# Patient Record
Sex: FEMALE | Race: BLACK OR AFRICAN AMERICAN | NOT HISPANIC OR LATINO
[De-identification: names, ages, dates, MRNs, and addresses within clinical notes are randomized per-mention and may not be internally consistent; named-entity substitution may affect disease eponyms.]

---

## 2020-08-07 ENCOUNTER — DASHBOARD ENCOUNTERS (OUTPATIENT)
Age: 20
End: 2020-08-07

## 2020-08-07 ENCOUNTER — WEB ENCOUNTER (OUTPATIENT)
Dept: URBAN - METROPOLITAN AREA CLINIC 84 | Facility: CLINIC | Age: 20
End: 2020-08-07

## 2020-08-07 ENCOUNTER — OFFICE VISIT (OUTPATIENT)
Dept: URBAN - METROPOLITAN AREA CLINIC 84 | Facility: CLINIC | Age: 20
End: 2020-08-07
Payer: COMMERCIAL

## 2020-08-07 DIAGNOSIS — R19.4 CHANGE IN BOWEL HABITS: ICD-10-CM

## 2020-08-07 DIAGNOSIS — R10.13 DYSPEPSIA: ICD-10-CM

## 2020-08-07 DIAGNOSIS — R10.84 LEFT LOWER ABDOMINAL PAIN: ICD-10-CM

## 2020-08-07 DIAGNOSIS — R10.31 RIGHT LOWER QUADRANT ABDOMINAL PAIN: ICD-10-CM

## 2020-08-07 PROCEDURE — G9903 PT SCRN TBCO ID AS NON USER: HCPCS | Performed by: INTERNAL MEDICINE

## 2020-08-07 PROCEDURE — G8427 DOCREV CUR MEDS BY ELIG CLIN: HCPCS | Performed by: INTERNAL MEDICINE

## 2020-08-07 PROCEDURE — 99244 OFF/OP CNSLTJ NEW/EST MOD 40: CPT | Performed by: INTERNAL MEDICINE

## 2020-08-07 PROCEDURE — 1036F TOBACCO NON-USER: CPT | Performed by: INTERNAL MEDICINE

## 2020-08-07 PROCEDURE — G8417 CALC BMI ABV UP PARAM F/U: HCPCS | Performed by: INTERNAL MEDICINE

## 2020-08-07 RX ORDER — SODIUM, POTASSIUM,MAG SULFATES 17.5-3.13G
354 ML SOLUTION, RECONSTITUTED, ORAL ORAL ONCE
Qty: 354 MILLILITER | Refills: 0 | OUTPATIENT
Start: 2020-08-07 | End: 2020-08-08

## 2020-08-07 NOTE — HPI-TODAY'S VISIT:
20 year old woman referrred by Ms Brunson with GI complaints.  She has been having bad abdominal pain.  The pain has been present for a few weeks.  It's a lower abdominal pain that is sharp/stabbing.  The pain can typically last the entire day.  There are no obvious triggers.  The pain does radiate to the back. The pain does not change with position.  There is associated N/V.  She thinks the pain does improve with a BM.  In between the episodes she feels fine.  In general she has a BM every 2 days.  She has pellet stools. She has strain and incomplete evacuation.  She thinks that the pain is related to her constipation.  She has tried some OTC laxatives including Miralax without relief.  She denies LGI bleed or melena.  The pain does increase with PO intake.  She denies weight loss. She does have baseline nausea.  She denies vomiting.  She denies GERD or dysphagia.  She went to the ER on 7/23.  Labs and CT Scan were normal

## 2020-08-14 ENCOUNTER — OFFICE VISIT (OUTPATIENT)
Dept: URBAN - METROPOLITAN AREA SURGERY CENTER 20 | Facility: SURGERY CENTER | Age: 20
End: 2020-08-14